# Patient Record
Sex: FEMALE | Race: WHITE
[De-identification: names, ages, dates, MRNs, and addresses within clinical notes are randomized per-mention and may not be internally consistent; named-entity substitution may affect disease eponyms.]

---

## 2023-04-26 ENCOUNTER — HOSPITAL ENCOUNTER (OUTPATIENT)
Dept: HOSPITAL 47 - RADMRIMAIN | Age: 68
Discharge: HOME | End: 2023-04-26
Attending: OPHTHALMOLOGY
Payer: MEDICARE

## 2023-04-26 DIAGNOSIS — H47.012: ICD-10-CM

## 2023-04-26 DIAGNOSIS — G31.9: Primary | ICD-10-CM

## 2023-04-26 DIAGNOSIS — H53.8: ICD-10-CM

## 2023-04-26 PROCEDURE — 70553 MRI BRAIN STEM W/O & W/DYE: CPT

## 2023-04-27 NOTE — MR
EXAMINATION TYPE: MR brain wo/w con

 

DATE OF EXAM: 4/26/2023

 

COMPARISON: NONE

 

HISTORY: Partial loss of vision Left eye, blurred vision

 

TECHNIQUE: 

Multiplanar, multisequence images of the brain and brainstem is performed without and with IV contras
t, utilizing 5.5 mL intravenous Gadavist .

 

FINDINGS: Diffusion weighted images demonstrate no evidence of a recent infarct or other diffusion ab
normality.  There is mild ventricular and sulcal prominence. Occasional small scattered focus of T2 h
yperintensity is seen throughout the white matter bilaterally. Approximately 10-15 scattered tiny les
ions are seen. No suspicious intraparenchymal blood products on the T2 star weighted images.

 

Midline structures demonstrate normal morphology. Suprasellar cistern is maintained. The craniocervic
al junction appears within normal limits.  Post contrast images demonstrate no abnormal enhancement. 
The dural venous sinuses appear patent. The visualized sinuses are clear. The globes appear intact bi
laterally. 

 

IMPRESSION: Mild diffuse age-related cerebral atrophy and probable chronic small vessel ischemic fields
ge. No abnormal enhancement. No suspicious finding to account for patient's symptoms.

## 2023-05-17 ENCOUNTER — HOSPITAL ENCOUNTER (OUTPATIENT)
Dept: HOSPITAL 47 - RADUSWWP | Age: 68
Discharge: HOME | End: 2023-05-17
Attending: OPHTHALMOLOGY
Payer: MEDICARE

## 2023-05-17 DIAGNOSIS — H47.012: ICD-10-CM

## 2023-05-17 DIAGNOSIS — E78.5: ICD-10-CM

## 2023-05-17 DIAGNOSIS — I65.21: Primary | ICD-10-CM

## 2023-05-17 DIAGNOSIS — H53.8: ICD-10-CM

## 2023-05-17 PROCEDURE — 93880 EXTRACRANIAL BILAT STUDY: CPT

## 2023-05-17 NOTE — US
EXAMINATION TYPE: US carotid duplex BILAT

 

DATE OF EXAM: 5/17/2023

 

COMPARISON: MR brain

 

CLINICAL INDICATION: Female, 67 years old with history of H53.8 BLURRED VISION; Blurred vision left e
ye. Hyperlipidemia, former smoker.

 

TECHNIQUE: Carotid duplex ultrasound examination. Indirect Doppler criteria was utilized.

 

FINDINGS:

 

EXAM MEASUREMENTS: 

 

RIGHT:  Peak Systolic Velocity (PSV) cm/sec

----- Right CCA:  97.4  

----- Right ICA:  145.9     

----- Right ECA:  116.0   

ICA/CCA ratio:  1.5    

 

RIGHT:  End Diastole cm/sec

----- Right CCA:  28.1   

----- Right ICA:  48.9      

----- Right ECA:  12.4     

 

LEFT:  Peak Systolic Velocity (PSV) cm/sec

----- Left CCA:  101.8  

----- Left ICA:  117.3   

----- Left ECA:  82.5  

ICA/CCA ratio:  1.2  

 

LEFT:  End Diastole cm/sec

----- Left CCA:  33.6  

----- Left ICA:  39.6   

----- Left ECA:  0.0 

 

VERTEBRALS (direction of flow):

Right Vertebral: Antegrade

Left Vertebral: Antegrade

 

Rhythm:  Normal

 

 

SONOGRAPHER NOTES: *Minimal plaque seen within the right bulb. *Elevated velocity noted within the ri
ght ICA. 

Left vertebral velocity of 103.6 cm/s. 

 

Minimal atherosclerotic plaque within the right carotid bulb. Mildly elevated velocity within the rig
ht ICA.

 

IMPRESSION:  

 

1. Less than 50% stenosis at the origin of the right internal carotid artery.

2. No hemodynamic significant stenosis of the left internal carotid artery.   

 

 

 

 

 

 

Criteria for Assigning % of Stenosis / Diameter reduction

(Estimation based on the indirect measurements of the internal carotid artery velocities (ICA PSV).

1.  Normal (no stenosis)=ICA PSV < 125 cm/s: ratio < 2.0: ICA EDV<40 cm/s.

2. Less than 50% stenosis=ICA PSV < 125 cm/s: ratio < 2.0: ICA EDV<40 cm/s.

3.  50 to 69% stenosis=ICA PSV of 125 to 230 cm/s: ration 2.0 ? 4.0: ICA EDV  cm/s.

4.  Greater than 70% stenosis to near occlusion= ICA PSV > 230 cm/s: ratio > 4.0: ICA EDV > 100 cm/s.
 

5.  Near occlusion= ICA PSV velocities may be low or undetectable: variable ratio and ICA EDV.

6.  Total occlusion=unable to detect flow.

## 2024-12-30 ENCOUNTER — HOSPITAL ENCOUNTER (OUTPATIENT)
Dept: HOSPITAL 47 - RADMAMWWP | Age: 69
Discharge: HOME | End: 2024-12-30
Attending: FAMILY MEDICINE
Payer: MEDICARE

## 2024-12-30 DIAGNOSIS — Z78.0: ICD-10-CM

## 2024-12-30 DIAGNOSIS — R92.333: ICD-10-CM

## 2024-12-30 DIAGNOSIS — Z12.31: Primary | ICD-10-CM

## 2024-12-30 PROCEDURE — 77067 SCR MAMMO BI INCL CAD: CPT

## 2024-12-30 PROCEDURE — 77063 BREAST TOMOSYNTHESIS BI: CPT

## 2024-12-31 NOTE — MM
Reason for Exam: Screening  (asymptomatic). 

Last screening mammogram was performed 10 month(s) ago.





Patient History: 

Menarche at age 12. First Full-Term Pregnancy at age 17. Postmenopausal. 





Risk Values: 

Risa 5 year model risk: 1.2%.

NCI Lifetime model risk: 3.9%.





Prior Study Comparison: 

12/28/2023 Bilateral Screening Mammogram, Unknown. 2/2/2024 Right Diagnostic Mammogram, Unknown. 





Tissue Density: 

The breasts are heterogeneously dense, which may obscure small masses.





Findings: 

Analyzed By CAD. 

The pattern is symmetrical.

There are a few scattered benign vascular and punctate calcifications present bilaterally. No

significant interval change.

No suspicious groups of microcalcifications, spiculated or lobular masses, architectural distortion

or other secondary signs of malignancy are mammographically apparent. 





Overall Assessment: Benign, BI-RAD 2





Management: 

Screening Mammogram of both breasts in 1 year.

A negative mammogram report should not preclude additional follow up of suspicious palpable

abnormalities.

Patient should continue monthly self breast exam.

A clinical breast exam by your physician is recommended on an annual basis and results should be

correlated with mammographic findings.



Note on Risa scores and lifetime risk:

1. A Risa score greater than 3% is considered moderate risk. If this is the case, consider

specialist referral to assess eligibility for a risk reducing agent.

2. If overall lifetime risk for the development of breast cancer is 20% or higher, the patient may

qualify for future screening with alternating mammogram and breast MRI.



[facility], Workstation: [id number], 12/31/2024 8:51 AM.



Electronically signed and approved by: [employer] Sanford D.O. Radiologis Yes

## 2025-07-25 ENCOUNTER — APPOINTMENT (OUTPATIENT)
Dept: URBAN - NONMETROPOLITAN AREA CLINIC 53 | Age: 70
Setting detail: DERMATOLOGY
End: 2025-07-27

## 2025-07-25 VITALS — WEIGHT: 115 LBS | HEIGHT: 67 IN

## 2025-07-25 DIAGNOSIS — L40.0 PSORIASIS VULGARIS: ICD-10-CM

## 2025-07-25 DIAGNOSIS — L57.8 OTHER SKIN CHANGES DUE TO CHRONIC EXPOSURE TO NONIONIZING RADIATION: ICD-10-CM

## 2025-07-25 DIAGNOSIS — D18.0 HEMANGIOMA: ICD-10-CM

## 2025-07-25 DIAGNOSIS — L24 IRRITANT CONTACT DERMATITIS: ICD-10-CM

## 2025-07-25 DIAGNOSIS — L81.4 OTHER MELANIN HYPERPIGMENTATION: ICD-10-CM

## 2025-07-25 DIAGNOSIS — L82.1 OTHER SEBORRHEIC KERATOSIS: ICD-10-CM

## 2025-07-25 DIAGNOSIS — D22 MELANOCYTIC NEVI: ICD-10-CM

## 2025-07-25 PROBLEM — D18.01 HEMANGIOMA OF SKIN AND SUBCUTANEOUS TISSUE: Status: ACTIVE | Noted: 2025-07-25

## 2025-07-25 PROBLEM — D22.5 MELANOCYTIC NEVI OF TRUNK: Status: ACTIVE | Noted: 2025-07-25

## 2025-07-25 PROBLEM — D23.71 OTHER BENIGN NEOPLASM OF SKIN OF RIGHT LOWER LIMB, INCLUDING HIP: Status: ACTIVE | Noted: 2025-07-25

## 2025-07-25 PROBLEM — L24.9 IRRITANT CONTACT DERMATITIS, UNSPECIFIED CAUSE: Status: ACTIVE | Noted: 2025-07-25

## 2025-07-25 PROCEDURE — OTHER MIPS QUALITY: OTHER

## 2025-07-25 PROCEDURE — OTHER MDM - TREATMENT GOALS: OTHER

## 2025-07-25 PROCEDURE — 99203 OFFICE O/P NEW LOW 30 MIN: CPT

## 2025-07-25 PROCEDURE — OTHER PRESCRIPTION: OTHER

## 2025-07-25 PROCEDURE — OTHER COUNSELING: OTHER

## 2025-07-25 PROCEDURE — OTHER PRESCRIPTION MEDICATION MANAGEMENT: OTHER

## 2025-07-25 RX ORDER — FLUOCINONIDE 0.5 MG/ML
SOLUTION TOPICAL
Qty: 60 | Refills: 9 | Status: ERX | COMMUNITY
Start: 2025-07-25

## 2025-07-25 RX ORDER — KETOCONAZOLE 20 MG/ML
SHAMPOO, SUSPENSION TOPICAL
Qty: 120 | Refills: 9 | Status: ERX | COMMUNITY
Start: 2025-07-25

## 2025-07-25 RX ORDER — TRIAMCINOLONE ACETONIDE 1 MG/G
CREAM TOPICAL
Qty: 45 | Refills: 3 | Status: ERX | COMMUNITY
Start: 2025-07-25

## 2025-07-25 ASSESSMENT — LOCATION DETAILED DESCRIPTION DERM
LOCATION DETAILED: LEFT SUPERIOR MEDIAL UPPER BACK
LOCATION DETAILED: LEFT INFERIOR CENTRAL MALAR CHEEK
LOCATION DETAILED: LEFT MEDIAL UPPER BACK
LOCATION DETAILED: MID POSTERIOR NECK
LOCATION DETAILED: RIGHT PROXIMAL DORSAL FOREARM
LOCATION DETAILED: RIGHT ANTECUBITAL SKIN
LOCATION DETAILED: RIGHT MEDIAL UPPER BACK

## 2025-07-25 ASSESSMENT — LOCATION ZONE DERM
LOCATION ZONE: ARM
LOCATION ZONE: FACE
LOCATION ZONE: TRUNK
LOCATION ZONE: NECK

## 2025-07-25 ASSESSMENT — LOCATION SIMPLE DESCRIPTION DERM
LOCATION SIMPLE: RIGHT FOREARM
LOCATION SIMPLE: LEFT UPPER BACK
LOCATION SIMPLE: POSTERIOR NECK
LOCATION SIMPLE: RIGHT UPPER ARM
LOCATION SIMPLE: LEFT CHEEK
LOCATION SIMPLE: RIGHT UPPER BACK

## 2025-07-25 ASSESSMENT — BSA RASH: BSA RASH: 1

## 2025-07-25 ASSESSMENT — BSA PSORIASIS: % BODY COVERED IN PSORIASIS: 1

## 2025-07-25 ASSESSMENT — SEVERITY ASSESSMENT 2020: SEVERITY 2020: MILD

## 2025-07-25 ASSESSMENT — PGA PSORIASIS: PGA PSORIASIS 2020: MODERATE
